# Patient Record
Sex: MALE | Race: WHITE | NOT HISPANIC OR LATINO | Employment: OTHER | URBAN - METROPOLITAN AREA
[De-identification: names, ages, dates, MRNs, and addresses within clinical notes are randomized per-mention and may not be internally consistent; named-entity substitution may affect disease eponyms.]

---

## 2018-11-19 ENCOUNTER — HOSPITAL ENCOUNTER (OUTPATIENT)
Dept: GENERAL RADIOLOGY | Facility: HOSPITAL | Age: 78
Discharge: HOME | End: 2018-11-19
Attending: INTERNAL MEDICINE | Admitting: INTERNAL MEDICINE

## 2018-11-19 ENCOUNTER — TRANSCRIBE ORDERS (OUTPATIENT)
Dept: ADMINISTRATIVE | Facility: HOSPITAL | Age: 78
End: 2018-11-19

## 2018-11-19 DIAGNOSIS — R05.9 COUGH: Primary | ICD-10-CM

## 2018-11-19 PROCEDURE — 71046 X-RAY EXAM CHEST 2 VIEWS: CPT

## 2019-06-04 ENCOUNTER — LAB REQUISITION (OUTPATIENT)
Dept: LAB | Facility: HOSPITAL | Age: 79
End: 2019-06-04

## 2019-06-04 DIAGNOSIS — Z00.00 ROUTINE GENERAL MEDICAL EXAMINATION AT A HEALTH CARE FACILITY: ICD-10-CM

## 2019-06-04 PROCEDURE — 36415 COLL VENOUS BLD VENIPUNCTURE: CPT | Performed by: INTERNAL MEDICINE

## 2022-10-28 PROBLEM — M81.0 SENILE OSTEOPOROSIS: Status: ACTIVE | Noted: 2022-10-28

## 2022-10-31 ENCOUNTER — INFUSION (OUTPATIENT)
Dept: ONCOLOGY | Facility: HOSPITAL | Age: 82
End: 2022-10-31

## 2022-10-31 VITALS
SYSTOLIC BLOOD PRESSURE: 134 MMHG | HEART RATE: 106 BPM | HEIGHT: 69 IN | RESPIRATION RATE: 18 BRPM | DIASTOLIC BLOOD PRESSURE: 82 MMHG | BODY MASS INDEX: 25.62 KG/M2 | WEIGHT: 173 LBS | TEMPERATURE: 97.8 F

## 2022-10-31 DIAGNOSIS — M81.0 SENILE OSTEOPOROSIS: Primary | ICD-10-CM

## 2022-10-31 PROCEDURE — 96372 THER/PROPH/DIAG INJ SC/IM: CPT

## 2022-10-31 PROCEDURE — 25010000002 DENOSUMAB 60 MG/ML SOLUTION PREFILLED SYRINGE: Performed by: INTERNAL MEDICINE

## 2022-10-31 RX ORDER — FUROSEMIDE 20 MG/1
20 TABLET ORAL EVERY OTHER DAY
COMMUNITY

## 2022-10-31 RX ORDER — DOXAZOSIN 2 MG/1
2 TABLET ORAL DAILY
COMMUNITY
Start: 2022-08-24

## 2022-10-31 RX ORDER — RIVAROXABAN 20 MG/1
20 TABLET, FILM COATED ORAL DAILY
COMMUNITY
Start: 2022-09-13

## 2022-10-31 RX ORDER — FLUTICASONE PROPIONATE 50 MCG
2 SPRAY, SUSPENSION (ML) NASAL DAILY
COMMUNITY

## 2022-10-31 RX ORDER — ROSUVASTATIN CALCIUM 20 MG/1
20 TABLET, COATED ORAL
COMMUNITY
Start: 2022-08-07

## 2022-10-31 RX ADMIN — DENOSUMAB 60 MG: 60 INJECTION SUBCUTANEOUS at 11:09

## 2023-02-28 ENCOUNTER — TRANSCRIBE ORDERS (OUTPATIENT)
Dept: ADMINISTRATIVE | Facility: HOSPITAL | Age: 83
End: 2023-02-28
Payer: MEDICARE

## 2023-02-28 DIAGNOSIS — R22.1 LOCALIZED SWELLING, MASS AND LUMP, NECK: Primary | ICD-10-CM

## 2023-03-09 ENCOUNTER — HOSPITAL ENCOUNTER (OUTPATIENT)
Dept: ULTRASOUND IMAGING | Facility: HOSPITAL | Age: 83
Discharge: HOME | End: 2023-03-09
Admitting: INTERNAL MEDICINE
Payer: MEDICARE

## 2023-03-09 DIAGNOSIS — R22.1 LOCALIZED SWELLING, MASS AND LUMP, NECK: ICD-10-CM

## 2023-03-09 PROCEDURE — 76536 US EXAM OF HEAD AND NECK: CPT

## 2023-03-13 ENCOUNTER — TRANSCRIBE ORDERS (OUTPATIENT)
Dept: ADMINISTRATIVE | Facility: HOSPITAL | Age: 83
End: 2023-03-13
Payer: MEDICARE

## 2023-03-13 DIAGNOSIS — R22.1 LOCALIZED SWELLING, MASS AND LUMP, NECK: Primary | ICD-10-CM

## 2023-03-14 ENCOUNTER — HOSPITAL ENCOUNTER (OUTPATIENT)
Dept: CT IMAGING | Facility: HOSPITAL | Age: 83
Discharge: HOME | End: 2023-03-14
Admitting: INTERNAL MEDICINE
Payer: MEDICARE

## 2023-03-14 DIAGNOSIS — R22.1 LOCALIZED SWELLING, MASS AND LUMP, NECK: ICD-10-CM

## 2023-03-14 LAB — CREAT BLDA-MCNC: 1.4 MG/DL (ref 0.6–1.3)

## 2023-03-14 PROCEDURE — 70491 CT SOFT TISSUE NECK W/DYE: CPT

## 2023-03-14 PROCEDURE — 82565 ASSAY OF CREATININE: CPT

## 2023-03-14 PROCEDURE — 25510000001 IOPAMIDOL 61 % SOLUTION: Performed by: INTERNAL MEDICINE

## 2023-03-14 RX ADMIN — IOPAMIDOL 80 ML: 612 INJECTION, SOLUTION INTRAVENOUS at 14:51

## 2023-04-05 ENCOUNTER — OFFICE VISIT (OUTPATIENT)
Dept: CARDIOLOGY | Facility: CLINIC | Age: 83
End: 2023-04-05
Payer: MEDICARE

## 2023-04-05 VITALS
OXYGEN SATURATION: 98 % | HEART RATE: 69 BPM | DIASTOLIC BLOOD PRESSURE: 74 MMHG | HEIGHT: 69 IN | WEIGHT: 171 LBS | SYSTOLIC BLOOD PRESSURE: 116 MMHG | BODY MASS INDEX: 25.33 KG/M2

## 2023-04-05 DIAGNOSIS — I10 ESSENTIAL HYPERTENSION: ICD-10-CM

## 2023-04-05 DIAGNOSIS — I48.21 PERMANENT ATRIAL FIBRILLATION: Primary | ICD-10-CM

## 2023-04-05 DIAGNOSIS — Z01.810 PRE-OPERATIVE CARDIOVASCULAR EXAMINATION: ICD-10-CM

## 2023-04-05 DIAGNOSIS — E78.2 MIXED HYPERLIPIDEMIA: ICD-10-CM

## 2023-04-05 PROCEDURE — 3078F DIAST BP <80 MM HG: CPT | Performed by: INTERNAL MEDICINE

## 2023-04-05 PROCEDURE — 93000 ELECTROCARDIOGRAM COMPLETE: CPT | Performed by: INTERNAL MEDICINE

## 2023-04-05 PROCEDURE — 99204 OFFICE O/P NEW MOD 45 MIN: CPT | Performed by: INTERNAL MEDICINE

## 2023-04-05 PROCEDURE — 3074F SYST BP LT 130 MM HG: CPT | Performed by: INTERNAL MEDICINE

## 2023-04-05 RX ORDER — MELATONIN
2000 DAILY
COMMUNITY

## 2023-04-05 RX ORDER — EZETIMIBE 10 MG/1
10 TABLET ORAL DAILY
COMMUNITY

## 2023-04-05 RX ORDER — OMEGA-3-ACID ETHYL ESTERS 1 G/1
2 CAPSULE, LIQUID FILLED ORAL DAILY
COMMUNITY
Start: 2022-12-28

## 2023-04-05 RX ORDER — DILTIAZEM HYDROCHLORIDE 240 MG/1
1 CAPSULE, COATED, EXTENDED RELEASE ORAL DAILY
COMMUNITY
Start: 2023-03-05

## 2023-04-05 RX ORDER — SACCHAROMYCES BOULARDII 250 MG
250 CAPSULE ORAL 2 TIMES DAILY
COMMUNITY

## 2023-04-05 RX ORDER — ACETAMINOPHEN 500 MG
500 TABLET ORAL EVERY 6 HOURS PRN
COMMUNITY

## 2023-04-05 RX ORDER — FINASTERIDE 5 MG/1
1 TABLET, FILM COATED ORAL DAILY
COMMUNITY
Start: 2023-03-05

## 2023-04-05 NOTE — PROGRESS NOTES
[facility] Cardiology  [address], [address]  [place], [place], [address] [phone]  WWW.[facility]           OUTPATIENT CLINIC CONSULTATION NOTE    Patient care team:  Patient Care Team:  Ronan Gardner MD as PCP - General (Internal Medicine)    Requesting Provider and Reason for consultation: The patient is being seen today at the request of Dr. Gardner for preoperative cardiac risk assessment.     Subjective:   Chief complaint:   Chief Complaint   Patient presents with   • Establish Care     Consultation for cardiac clearance        HPI:    Esau Ramey is a 82 y.o. male.  Cardiac focused problem list:  1. Atrial fibrillation  a. 2/26/2016 EKG in EMR confirming rhythm  b. Prior ECV with conversion to NSR, but possibly in atrial fibrillation for many years  c. Previously followed by Dr. Britt anthony. No known prior stress test or heart cath  2. Parotid cancer  3. Thyroid nodules  4. Essential hypertension  5. Mixed hyperlipidemia  6. CKD  7. ASIF  8. History of prediabetes  9. Asthma  10. BPH    Patient presents for preoperative cardiac risk assessment.  History of A-fib discussed with the patient today.  Some details noted above.  Denies palpitations.  Heart rate seems to be controlled on diltiazem.  Able to walk 1 mile without cardiopulmonary symptoms.  Could walk further, but generally does try to.  Thyroid biopsy today.  Has been holding Xarelto.  No bleeding diathesis on Xarelto    Non-smoker.    Review of Systems:  As noted above in the HPI    PFSH:  Patient Active Problem List   Diagnosis   • Senile osteoporosis         Current Outpatient Medications:   •  acetaminophen (TYLENOL) 500 MG tablet, Take 1 tablet by mouth Every 6 (Six) Hours As Needed for Mild Pain., Disp: , Rfl:   •  Cholecalciferol 25 MCG (1000 UT) tablet, Take 2 tablets by mouth Daily., Disp: , Rfl:   •  dilTIAZem CD (CARDIZEM CD) 240 MG 24 hr capsule, Take 1 capsule by mouth Daily., Disp: ,  "Rfl:   •  doxazosin (CARDURA) 2 MG tablet, Take 1 tablet by mouth Daily., Disp: , Rfl:   •  ezetimibe (ZETIA) 10 MG tablet, Take 1 tablet by mouth Daily., Disp: , Rfl:   •  finasteride (PROSCAR) 5 MG tablet, Take 1 tablet by mouth Daily., Disp: , Rfl:   •  fluticasone (FLONASE) 50 MCG/ACT nasal spray, 2 sprays into the nostril(s) as directed by provider Daily., Disp: , Rfl:   •  furosemide (LASIX) 20 MG tablet, Take 1 tablet by mouth Every Other Day. Takes as needed, typically takes QOD, Disp: , Rfl:   •  omega-3 acid ethyl esters (LOVAZA) 1 g capsule, Take 2 capsules by mouth Daily., Disp: , Rfl:   •  rosuvastatin (CRESTOR) 20 MG tablet, Take 1 tablet by mouth every night at bedtime., Disp: , Rfl:   •  saccharomyces boulardii (FLORASTOR) 250 MG capsule, Take 1 capsule by mouth 2 (Two) Times a Day., Disp: , Rfl:   •  Xarelto 20 MG tablet, Take 1 tablet by mouth Daily. with food, Disp: , Rfl:     No Known Allergies    Social History     Socioeconomic History   • Marital status:    Tobacco Use   • Smoking status: Never     Passive exposure: Past   • Smokeless tobacco: Never   Vaping Use   • Vaping Use: Never used   Substance and Sexual Activity   • Alcohol use: Yes     Alcohol/week: 2.0 standard drinks     Types: 2 Glasses of wine per week     Comment: Occasionally   • Drug use: Never   • Sexual activity: Defer     Family History   Problem Relation Age of Onset   • No Known Problems Mother    • No Known Problems Father          Objective:   Physical Exam:  /74   Pulse 69   Ht 175.3 cm (69\")   Wt 77.6 kg (171 lb)   SpO2 98%   BMI 25.25 kg/m²   CONSTITUTIONAL: Well-nourished. In no acute distress.   LUNGS: Normal effort. Clear to auscultation bilaterally without wheezing, rhonchi, or rales noted.   CARDIOVASCULAR: Irregularly irregular rate and rhythm.  Soft systolic murmur at right upper sternal border without radiation.  Carotid upstrokes are 2+ and symmetrical without bruits.  2+ radial pulses " bilaterally.  There is mild peripheral edema.     Labs:  Labs reviewed by myself    No results found for: CHOL  No results found for: TRIG  No results found for: HDL  No results found for: LDL  No components found for: LDLDIRECTC    Diagnostic Data:      ECG 12 Lead    Date/Time: 4/5/2023 10:34 AM  Performed by: Alberto Joy MD  Authorized by: Alberto Joy MD   Comparison: compared with previous ECG from 2/26/2016  Similar to previous ECG  Rhythm: atrial fibrillation                Assessment and Plan:     Permanent atrial fibrillation  Soft cardiac murmur, suspected aortic valve sclerosis  Pre-operative cardiovascular examination  -Recommend preoperative echo.  We will review most recent labs when available.  If echo without remarkable findings okay to proceed.  We will send a note to Dr. Multani's office for final preoperative cardiac risk assessment including recommendations regarding Xarelto (hold for 48 full hours prior to the day of surgery)  -Continue diltiazem, Xarelto    Essential hypertension  -At goal, continue medical therapy    Mixed hyperlipidemia   -Requesting PCP labs.  Continue current medical therapy    - Return in about 1 year (around 4/5/2024) for Next scheduled follow-up with an ECG.    Alberto Joy MD, MSc, FACC, [facility]  Interventional Cardiology  [facility]

## 2023-04-12 ENCOUNTER — HOSPITAL ENCOUNTER (OUTPATIENT)
Dept: CARDIOLOGY | Facility: HOSPITAL | Age: 83
Discharge: HOME | End: 2023-04-12
Admitting: INTERNAL MEDICINE
Payer: MEDICARE

## 2023-04-12 VITALS
HEIGHT: 69 IN | SYSTOLIC BLOOD PRESSURE: 132 MMHG | WEIGHT: 171 LBS | DIASTOLIC BLOOD PRESSURE: 81 MMHG | BODY MASS INDEX: 25.33 KG/M2

## 2023-04-12 DIAGNOSIS — I48.21 PERMANENT ATRIAL FIBRILLATION: ICD-10-CM

## 2023-04-12 LAB
BH CV ECHO MEAS - AI P1/2T: 441.5 MSEC
BH CV ECHO MEAS - AO MAX PG: 4.2 MMHG
BH CV ECHO MEAS - AO MEAN PG: 2.2 MMHG
BH CV ECHO MEAS - AO ROOT DIAM: 3.6 CM
BH CV ECHO MEAS - AO V2 MAX: 102.6 CM/SEC
BH CV ECHO MEAS - AO V2 VTI: 19.9 CM
BH CV ECHO MEAS - AVA(I,D): 2.6 CM2
BH CV ECHO MEAS - EDV(CUBED): 32.8 ML
BH CV ECHO MEAS - EDV(MOD-SP2): 56.1 ML
BH CV ECHO MEAS - EDV(MOD-SP4): 61.3 ML
BH CV ECHO MEAS - EF(MOD-BP): 60.8 %
BH CV ECHO MEAS - EF(MOD-SP2): 59.7 %
BH CV ECHO MEAS - EF(MOD-SP4): 60.2 %
BH CV ECHO MEAS - ESV(CUBED): 11.1 ML
BH CV ECHO MEAS - ESV(MOD-SP2): 22.6 ML
BH CV ECHO MEAS - ESV(MOD-SP4): 24.4 ML
BH CV ECHO MEAS - FS: 30.3 %
BH CV ECHO MEAS - IVS/LVPW: 1 CM
BH CV ECHO MEAS - IVSD: 1.3 CM
BH CV ECHO MEAS - LA DIMENSION: 4 CM
BH CV ECHO MEAS - LAT PEAK E' VEL: 10.2 CM/SEC
BH CV ECHO MEAS - LV DIASTOLIC VOL/BSA (35-75): 31.7 CM2
BH CV ECHO MEAS - LV MASS(C)D: 135.7 GRAMS
BH CV ECHO MEAS - LV MAX PG: 1.73 MMHG
BH CV ECHO MEAS - LV MEAN PG: 1 MMHG
BH CV ECHO MEAS - LV SYSTOLIC VOL/BSA (12-30): 12.6 CM2
BH CV ECHO MEAS - LV V1 MAX: 65.5 CM/SEC
BH CV ECHO MEAS - LV V1 VTI: 12.8 CM
BH CV ECHO MEAS - LVIDD: 3.2 CM
BH CV ECHO MEAS - LVIDS: 2.23 CM
BH CV ECHO MEAS - LVOT AREA: 4.1 CM2
BH CV ECHO MEAS - LVOT DIAM: 2.27 CM
BH CV ECHO MEAS - LVPWD: 1.3 CM
BH CV ECHO MEAS - MED PEAK E' VEL: 7.4 CM/SEC
BH CV ECHO MEAS - MR MAX PG: 61.4 MMHG
BH CV ECHO MEAS - MR MAX VEL: 391.6 CM/SEC
BH CV ECHO MEAS - MV DEC SLOPE: 617.5 CM/SEC2
BH CV ECHO MEAS - MV MAX PG: 6.7 MMHG
BH CV ECHO MEAS - MV MEAN PG: 2.19 MMHG
BH CV ECHO MEAS - MV P1/2T: 61.8 MSEC
BH CV ECHO MEAS - MV V2 VTI: 28.8 CM
BH CV ECHO MEAS - MVA(P1/2T): 3.6 CM2
BH CV ECHO MEAS - MVA(VTI): 1.8 CM2
BH CV ECHO MEAS - PA ACC TIME: 0.15 SEC
BH CV ECHO MEAS - PA PR(ACCEL): 9.3 MMHG
BH CV ECHO MEAS - PA V2 MAX: 93.7 CM/SEC
BH CV ECHO MEAS - PI END-D VEL: 71.9 CM/SEC
BH CV ECHO MEAS - RAP SYSTOLE: 15 MMHG
BH CV ECHO MEAS - RVSP: 43 MMHG
BH CV ECHO MEAS - SI(MOD-SP2): 17.3 ML/M2
BH CV ECHO MEAS - SI(MOD-SP4): 19.1 ML/M2
BH CV ECHO MEAS - SV(LVOT): 51.9 ML
BH CV ECHO MEAS - SV(MOD-SP2): 33.5 ML
BH CV ECHO MEAS - SV(MOD-SP4): 36.9 ML
BH CV ECHO MEAS - TAPSE (>1.6): 1.87 CM
BH CV ECHO MEAS - TR MAX PG: 28 MMHG
BH CV ECHO MEAS - TR MAX VEL: 195.7 CM/SEC
BH CV XLRA - RV BASE: 4 CM
BH CV XLRA - RV LENGTH: 6.6 CM
BH CV XLRA - RV MID: 3.1 CM
BH CV XLRA - TDI S': 14.4 CM/SEC
LEFT ATRIUM VOLUME INDEX: 49.5 ML/M2
MAXIMAL PREDICTED HEART RATE: 139 BPM
STRESS TARGET HR: 118 BPM

## 2023-04-12 PROCEDURE — 93306 TTE W/DOPPLER COMPLETE: CPT

## 2023-04-12 PROCEDURE — 93306 TTE W/DOPPLER COMPLETE: CPT | Performed by: INTERNAL MEDICINE

## 2023-04-13 ENCOUNTER — TELEPHONE (OUTPATIENT)
Dept: CARDIOLOGY | Facility: CLINIC | Age: 83
End: 2023-04-13
Payer: MEDICARE

## 2023-04-13 NOTE — TELEPHONE ENCOUNTER
----- Message from Alberto Joy MD sent at 4/12/2023  6:38 PM EDT -----  Echo shows longstanding changes including dilated atria, which is likely why he is in A-fib.  Okay to proceed with surgery.  Continue current plan.  Hold Xarelto for 48 hours prior to the day of surgery.  Please send preoperative cardiac assessment to ENT office.  Low cardiac risk for surgery.  At risk for perioperative volume overload, monitor volume status perioperatively  ----- Message -----  From: Alberto Joy MD  Sent: 4/12/2023   6:37 PM EDT  To: Alberto Joy MD

## 2023-04-13 NOTE — TELEPHONE ENCOUNTER
LVM with results and recommendations. Requested return call with questions or to discuss further.       Will send letter to [place] [facility] regarding surgical clearance.

## 2023-04-13 NOTE — LETTER
April 13, 2023       No Recipients    Patient: Esau Ramey   YOB: 1940/23      To Whom It May Concern,     Esau Ramey  1940 is okay to proceed with planned ENT surgery from a cardiac standpoint. Patient is low cardiac risk.  No further cardiac testing or medication titration is indicated prior to surgery. For any questions,  please call [phone].    [x]  Hold Xarelto 48 hours prior       [x]  Other: At risk for perioperative volume overload, monitor volume status perioperatively            Sincerely,         Alberto Joy MD

## 2023-05-11 ENCOUNTER — INFUSION (OUTPATIENT)
Dept: ONCOLOGY | Facility: HOSPITAL | Age: 83
End: 2023-05-11
Payer: MEDICARE

## 2023-05-11 VITALS
RESPIRATION RATE: 18 BRPM | DIASTOLIC BLOOD PRESSURE: 65 MMHG | HEART RATE: 89 BPM | SYSTOLIC BLOOD PRESSURE: 123 MMHG | TEMPERATURE: 97.2 F

## 2023-05-11 DIAGNOSIS — M81.0 SENILE OSTEOPOROSIS: Primary | ICD-10-CM

## 2023-05-11 PROCEDURE — 96372 THER/PROPH/DIAG INJ SC/IM: CPT

## 2023-05-11 PROCEDURE — 25010000002 DENOSUMAB 60 MG/ML SOLUTION PREFILLED SYRINGE: Performed by: INTERNAL MEDICINE

## 2023-05-11 RX ADMIN — DENOSUMAB 60 MG: 60 INJECTION SUBCUTANEOUS at 09:54

## 2023-11-09 ENCOUNTER — INFUSION (OUTPATIENT)
Dept: ONCOLOGY | Facility: HOSPITAL | Age: 83
End: 2023-11-09
Payer: MEDICARE

## 2023-11-09 VITALS
TEMPERATURE: 97.7 F | RESPIRATION RATE: 18 BRPM | SYSTOLIC BLOOD PRESSURE: 110 MMHG | HEART RATE: 80 BPM | DIASTOLIC BLOOD PRESSURE: 76 MMHG

## 2023-11-09 DIAGNOSIS — M81.0 SENILE OSTEOPOROSIS: Primary | ICD-10-CM

## 2023-11-09 PROCEDURE — 25010000002 DENOSUMAB 60 MG/ML SOLUTION PREFILLED SYRINGE: Performed by: INTERNAL MEDICINE

## 2023-11-09 PROCEDURE — 96372 THER/PROPH/DIAG INJ SC/IM: CPT

## 2023-11-09 RX ADMIN — DENOSUMAB 60 MG: 60 INJECTION SUBCUTANEOUS at 14:04

## 2024-05-09 ENCOUNTER — INFUSION (OUTPATIENT)
Dept: ONCOLOGY | Facility: HOSPITAL | Age: 84
End: 2024-05-09
Payer: MEDICARE

## 2024-05-09 VITALS
HEART RATE: 75 BPM | TEMPERATURE: 97.2 F | DIASTOLIC BLOOD PRESSURE: 68 MMHG | SYSTOLIC BLOOD PRESSURE: 119 MMHG | RESPIRATION RATE: 18 BRPM

## 2024-05-09 DIAGNOSIS — M81.0 SENILE OSTEOPOROSIS: Primary | ICD-10-CM

## 2024-05-09 PROCEDURE — 96372 THER/PROPH/DIAG INJ SC/IM: CPT

## 2024-05-09 PROCEDURE — 25010000002 DENOSUMAB 60 MG/ML SOLUTION PREFILLED SYRINGE: Performed by: INTERNAL MEDICINE

## 2024-05-09 RX ORDER — DONEPEZIL HYDROCHLORIDE 5 MG/1
1 TABLET, FILM COATED ORAL DAILY
COMMUNITY
Start: 2024-02-19

## 2024-05-09 RX ADMIN — DENOSUMAB 60 MG: 60 INJECTION SUBCUTANEOUS at 13:44

## 2024-05-29 ENCOUNTER — TRANSCRIBE ORDERS (OUTPATIENT)
Dept: ADMINISTRATIVE | Facility: HOSPITAL | Age: 84
End: 2024-05-29
Payer: MEDICARE

## 2024-05-29 DIAGNOSIS — R13.10 DYSPHAGIA, UNSPECIFIED TYPE: Primary | ICD-10-CM

## 2024-06-04 ENCOUNTER — HOSPITAL ENCOUNTER (OUTPATIENT)
Dept: GENERAL RADIOLOGY | Facility: HOSPITAL | Age: 84
Discharge: HOME | End: 2024-06-04
Admitting: OTOLARYNGOLOGY
Payer: MEDICARE

## 2024-06-04 DIAGNOSIS — R13.10 DYSPHAGIA, UNSPECIFIED TYPE: ICD-10-CM

## 2024-06-04 PROCEDURE — 74230 X-RAY XM SWLNG FUNCJ C+: CPT

## 2024-06-04 PROCEDURE — 92611 MOTION FLUOROSCOPY/SWALLOW: CPT

## 2024-06-04 PROCEDURE — A9270 NON-COVERED ITEM OR SERVICE: HCPCS | Performed by: OTOLARYNGOLOGY

## 2024-06-04 PROCEDURE — 63710000001 BARIUM SULFATE 40 % RECONSTITUTED SUSPENSION: Performed by: OTOLARYNGOLOGY

## 2024-06-04 PROCEDURE — 63710000001 BARIUM SULFATE 40 % PASTE: Performed by: OTOLARYNGOLOGY

## 2024-06-04 RX ADMIN — BARIUM SULFATE 20 ML: 400 PASTE ORAL at 10:11

## 2024-06-04 RX ADMIN — BARIUM SULFATE 100 ML: 0.81 POWDER, FOR SUSPENSION ORAL at 10:11

## 2024-06-04 NOTE — MBS/VFSS/FEES
Outpatient Speech Language Pathology   Adult Swallow Initial Evaluation   Calli    Modified Barium Swallow Study (MBS)       Patient Name: Esau Ramey  : 1940  MRN: [id number]  Today's Date: 2024         Visit Date: 2024   Patient Active Problem List   Diagnosis    Senile osteoporosis    Permanent atrial fibrillation    Essential hypertension    Mixed hyperlipidemia        Past Medical History:   Diagnosis Date    Arthropathy     Atrial fibrillation     Chronic Kidney disease     Hyperlipidemia     Hypertension     Prediabetes         Past Surgical History:   Procedure Laterality Date    SKIN BIOPSY      4x         Visit Dx:     ICD-10-CM ICD-9-CM   1. Dysphagia, unspecified type  R13.10 787.20                SLP Adult Swallow Evaluation       Row Name 24 0930       Rehab Evaluation    Document Type evaluation;other (see comments)  Modified Barium Swallow  -    Subjective Information no complaints  -    Patient Observations alert;cooperative;agree to therapy  -    Patient/Family/Caregiver Comments/Observations none present  -    Patient Effort excellent  -       General Information    Patient Profile Reviewed yes  -    Pertinent History Of Current Problem Hx R parotid mass s/p resection, thyroid nodules, senile osteoporosis. Patient c/o coughing at meals with both food and liquids. No recent pneumonia.  -    Current Method of Nutrition regular textures;thin liquids  -    Precautions/Limitations, Vision WFL;for purposes of eval  -    Precautions/Limitations, Hearing WFL;for purposes of eval  -    Prior Level of Function-Communication WFL  -    Prior Level of Function-Swallowing no diet consistency restrictions  -    Plans/Goals Discussed with patient;agreed upon  -    Barriers to Rehab none identified  -    Patient's Goals for Discharge eat/drink without coughing/choking  -       Pain    Additional Documentation Pain Scale: FACES Pre/Post-Treatment  (Group)  -CH       Pain Scale: FACES Pre/Post-Treatment    Pain: FACES Scale, Pretreatment 0-->no hurt  -CH    Posttreatment Pain Rating 0-->no hurt  -CH       Oral Motor Structure and Function    Dentition Assessment natural, present and adequate  -CH    Secretion Management WNL/WFL  -CH    Mucosal Quality moist, healthy  -CH    Volitional Swallow WFL  -CH       Oral Musculature and Cranial Nerve Assessment    Oral Motor General Assessment WFL  -CH       General Eating/Swallowing Observations    Respiratory Support Currently in Use room air  -    Eating/Swallowing Skills self-fed;appropriate self-feeding skills observed  -    Positioning During Eating upright 90 degree;upright in chair  -       MBS/VFSS    Utensils Used spoon;cup;straw  -    Consistencies Trialed thin liquids;pureed;regular textures  -       MBS/VFSS Interpretation    Oral Prep Phase WFL  -CH    Oral Transit Phase WFL  -CH    Oral Residue WFL  -CH    VFSS Summary Oral phase of swallow is grossly WFL. Penetration before the swallow with thin liquids via large straw sip only. No penetration with spoon or cup sips. No aspiration observed during study. Mild residue in vallecula observed which cleared with subsequent swallow. Cricopharyngeal bar noted during study which did not impede bolus flow but may result in increased sensitivity for patient. Recommend continuation of regular diet and thin liquids, no straw, small bites/sips, slow pace of eating, chew well.  -CH       Initiation of Pharyngeal Swallow    Initiation of Pharyngeal Swallow bolus in pyriform sinuses  -CH    Pharyngeal Phase impaired pharyngeal phase of swallowing  -CH    Anatomical abnormalities noted prominent cricopharyngeous/ cricopharyngeal bar  -CH    Anatomical abnormalities functional impact no functional impact on swallowing  -CH    Penetration Before the Swallow thin liquids;other (see comments)  via large straw sip only  -CH    Depth of Penetration deep  -CH     Response to Penetration No  -    No spontaneous response to penetration and effective laryngeal clearance with cue (see comments)  -    Rosenbek's Scale thin:;3--->level 3;pudding/puree:;regular textures:;1--->level 1  -CH    Pharyngeal Residue pudding/puree;valleculae;secondary to reduced posterior pharyngeal wall stripping  -    Response to Residue cleared residue with spontaneous subsequent swallow  -    Attempted Compensatory Maneuvers bolus size;bolus presentation style  -    Response to Attempted Compensatory Maneuvers prevented penetration  -    Successful Compensatory Maneuver Competency patient able to;demonstrate compensations;independently  -       Swallowing Quality of Life Assessment    Education and counseling provided Signs of aspiration;Risks of aspiration;Safest diet options;Compensatory strategy recommendations and rationale;Aspiration precautions  -       SLP Evaluation Clinical Impression    SLP Swallowing Diagnosis functional oral phase;mild;pharyngeal dysphagia  -    Functional Impact risk of aspiration/pneumonia  -       Recommendations    Therapy Frequency (Swallow) evaluation only  -    SLP Diet Recommendation regular textures;thin liquids;other (see comments)  no straw  -    Recommended Precautions and Strategies upright posture during/after eating;small bites of food and sips of liquid;no straw;general aspiration precautions;other (see comments)  swallow again , slow pace of eating, small cup sips  -    SLP Rec. for Method of Medication Administration meds whole;as tolerated  -              User Key  (r) = Recorded By, (t) = Taken By, (c) = Cosigned By      Initials Name Provider Type     Jennifer Sharp MS CCC-SLP Speech and Language Pathologist                                             Time Calculation:   SLP Start Time: 0930  Untimed Charges  SLP Eval/Re-eval : ST Motion Fluoro Eval Swallow - 89868  93005-TK Motion Fluoro Eval Swallow Minutes:  85  Total Minutes  Untimed Charges Total Minutes: 85   Total Minutes: 85    Therapy Charges for Today       Code Description Service Date Service Provider Modifiers Qty    42902997331 HC ST MOTION FLUORO EVAL SWALLOW 6 6/4/2024 Jennifer Sharp, MS CCC-SLP GN 1                     Jennifer Sharp MS CCC-SLP  6/4/2024

## 2024-07-30 ENCOUNTER — TELEPHONE (OUTPATIENT)
Dept: CARDIOLOGY | Facility: CLINIC | Age: 84
End: 2024-07-30
Payer: MEDICARE

## 2024-07-30 NOTE — TELEPHONE ENCOUNTER
Caller: Esau Ramey    Relationship to patient: Self    Best call back number: [phone]      Type of visit: F/U APPT    Requested date: NEXT AVAILABLE     Additional notes:PLEASE CONTACT PATIENT WITH A SUITABLE DATE.

## 2024-09-30 ENCOUNTER — OFFICE VISIT (OUTPATIENT)
Dept: CARDIOLOGY | Facility: CLINIC | Age: 84
End: 2024-09-30
Payer: MEDICARE

## 2024-09-30 VITALS
BODY MASS INDEX: 23.93 KG/M2 | WEIGHT: 161.6 LBS | DIASTOLIC BLOOD PRESSURE: 64 MMHG | HEART RATE: 78 BPM | HEIGHT: 69 IN | SYSTOLIC BLOOD PRESSURE: 126 MMHG | OXYGEN SATURATION: 98 %

## 2024-09-30 DIAGNOSIS — I48.0 PAROXYSMAL ATRIAL FIBRILLATION: Primary | ICD-10-CM

## 2024-09-30 DIAGNOSIS — I10 ESSENTIAL HYPERTENSION: ICD-10-CM

## 2024-09-30 DIAGNOSIS — E78.2 MIXED HYPERLIPIDEMIA: ICD-10-CM

## 2024-09-30 PROCEDURE — 93000 ELECTROCARDIOGRAM COMPLETE: CPT | Performed by: INTERNAL MEDICINE

## 2024-09-30 PROCEDURE — 3078F DIAST BP <80 MM HG: CPT | Performed by: INTERNAL MEDICINE

## 2024-09-30 PROCEDURE — 3074F SYST BP LT 130 MM HG: CPT | Performed by: INTERNAL MEDICINE

## 2024-09-30 PROCEDURE — 99214 OFFICE O/P EST MOD 30 MIN: CPT | Performed by: INTERNAL MEDICINE

## 2024-09-30 RX ORDER — EPINEPHRINE 0.3 MG/.3ML
0.3 INJECTION SUBCUTANEOUS AS NEEDED
COMMUNITY

## 2024-09-30 NOTE — PROGRESS NOTES
[facility] Cardiology  [address], [address]  [place], [place], [address] [phone]  [facility]           OUTPATIENT CLINIC NOTE    Patient care team:  Patient Care Team:  Ronan Gardner MD as PCP - General (Internal Medicine)      Subjective:   Chief complaint:   Chief Complaint   Patient presents with    Permanent atrial fibrillation       HPI:    Esau Ramey is a 83 y.o. male.  Cardiac focused problem list:  Atrial fibrillation  2/26/2016 EKG in EMR confirming rhythm  Prior ECV with conversion to NSR, but possibly in atrial fibrillation for many years  Previously followed by Dr. Britt Orellana  No known prior stress test or heart cath  Parotid cancer  Parotidectomy 5/2023  Thyroid nodules  Essential hypertension  Mixed hyperlipidemia  CKD  ASIF  History of prediabetes  Asthma  BPH    Patient presents for preoperative cardiac risk assessment.  Denies palpitations.  Heart rate seems to be controlled on diltiazem.  Able to walk 1 mile without cardiopulmonary symptoms.  No bleeding diathesis on Xarelto    Possible upcoming thyroid surgery, [facility]    Non-smoker.    Review of Systems:  As noted above in the HPI    PFSH:  Patient Active Problem List   Diagnosis    Senile osteoporosis    Permanent atrial fibrillation    Essential hypertension    Mixed hyperlipidemia         Current Outpatient Medications:     acetaminophen (TYLENOL) 500 MG tablet, Take 1 tablet by mouth Every 6 (Six) Hours As Needed for Mild Pain., Disp: , Rfl:     Cholecalciferol 25 MCG (1000 UT) tablet, Take 2 tablets by mouth Daily., Disp: , Rfl:     dilTIAZem CD (CARDIZEM CD) 240 MG 24 hr capsule, Take 1 capsule by mouth Daily., Disp: , Rfl:     donepezil (ARICEPT) 5 MG tablet, Take 1 tablet by mouth Daily., Disp: , Rfl:     doxazosin (CARDURA) 2 MG tablet, Take 1 tablet by mouth Daily., Disp: , Rfl:     EPINEPHrine (EPIPEN) 0.3 MG/0.3ML solution auto-injector injection, Inject 0.3 mL into the  "appropriate muscle as directed by prescriber As Needed., Disp: , Rfl:     ezetimibe (ZETIA) 10 MG tablet, Take 1 tablet by mouth Daily., Disp: , Rfl:     finasteride (PROSCAR) 5 MG tablet, Take 1 tablet by mouth Daily., Disp: , Rfl:     fluticasone (FLONASE) 50 MCG/ACT nasal spray, Administer 2 sprays into the nostril(s) as directed by provider Daily., Disp: , Rfl:     furosemide (LASIX) 20 MG tablet, Take 1 tablet by mouth Every Other Day. Takes as needed, typically takes QOD, Disp: , Rfl:     omega-3 acid ethyl esters (LOVAZA) 1 g capsule, Take 2 capsules by mouth Daily., Disp: , Rfl:     rosuvastatin (CRESTOR) 20 MG tablet, Take 1 tablet by mouth every night at bedtime., Disp: , Rfl:     Xarelto 20 MG tablet, Take 1 tablet by mouth Daily. with food, Disp: , Rfl:     No Known Allergies    Social History     Socioeconomic History    Marital status:    Tobacco Use    Smoking status: Never     Passive exposure: Past    Smokeless tobacco: Never   Vaping Use    Vaping status: Never Used   Substance and Sexual Activity    Alcohol use: Yes     Alcohol/week: 2.0 standard drinks of alcohol     Types: 2 Glasses of wine per week     Comment: Occasionally    Drug use: Never    Sexual activity: Defer         Objective:   Physical Exam:  /64 (BP Location: Right arm, Patient Position: Sitting, Cuff Size: Adult)   Pulse 78   Ht 175.3 cm (69\")   Wt 73.3 kg (161 lb 9.6 oz)   SpO2 98%   BMI 23.86 kg/m²   CONSTITUTIONAL: Well-nourished. In no acute distress.   CARDIOVASCULAR: Irregularly irregular rate and rhythm.  Soft systolic murmur at right upper sternal border without radiation.  Carotid upstrokes are 2+ and symmetrical without bruits.  2+ radial pulses bilaterally.      Labs:  Labs reviewed by myself    No results found for: \"CHOL\"  No results found for: \"TRIG\"  No results found for: \"HDL\"  No results found for: \"LDL\"  No components found for: \"LDLDIRECTC\"    Diagnostic Data:      ECG 12 Lead    Date/Time: " 9/30/2024 12:12 PM  Performed by: Alberto Joy MD    Authorized by: Alberto Joy MD  Comparison: compared with previous ECG from 4/5/2023  Similar to previous ECG  Rhythm: atrial fibrillation        4/2024 labs: A1c 5.8, creatinine 1.4, LFTs normal, LDL-C 51, HDL 72, TG 49,     Results for orders placed during the hospital encounter of 04/12/23    Adult Transthoracic Echo Complete w/ Color, Spectral and Contrast if necessary per protocol    Interpretation Summary    Left ventricular systolic function is normal. Left ventricular ejection fraction appears to be 56 - 60%.    Left ventricular wall thickness is consistent with mild concentric hypertrophy.    The right ventricular cavity is borderline dilated.    Left atrial volume is severely increased.    The right atrial cavity is moderately dilated.    There is mild calcification of the aortic valve.    Mild aortic valve regurgitation is present.    Moderate tricuspid valve regurgitation is present.    Estimated right ventricular systolic pressure from tricuspid regurgitation is mildly elevated (35-45 mmHg).      Assessment and Plan:     Permanent atrial fibrillation  Aortic valve calcification  -Continue diltiazem, Xarelto    Valvular heart disease  Dilated left atrium  Mildly elevated RVSP  -Will order repeat echo at next visit    Essential hypertension  -Continue current medication    Mixed hyperlipidemia   -Reviewed PCP labs.  Continue statin, Zetia    Preop cardiac risk assessment  -Possible upcoming thyroid surgery.  Tolerated parotidectomy without complication last year and a similar cardiovascular condition  -Okay to proceed from my standpoint  -Hold Xarelto for 3 full days prior to surgery, restart as soon as possible postoperatively if without bleeding complications    - Return in about 1 year (around 9/30/2025) for Next follow up with YOSELYN Ye, Next follow-up with an ECG.    Alberto Joy MD, MSc, FACC, [facility] [place]  Interventional  Cardiology  [facility]

## 2024-11-11 ENCOUNTER — INFUSION (OUTPATIENT)
Dept: ONCOLOGY | Facility: HOSPITAL | Age: 84
End: 2024-11-11
Payer: MEDICARE

## 2024-11-11 VITALS
BODY MASS INDEX: 23.85 KG/M2 | TEMPERATURE: 97.6 F | DIASTOLIC BLOOD PRESSURE: 80 MMHG | SYSTOLIC BLOOD PRESSURE: 144 MMHG | RESPIRATION RATE: 18 BRPM | HEIGHT: 69 IN | HEART RATE: 93 BPM

## 2024-11-11 DIAGNOSIS — M81.0 SENILE OSTEOPOROSIS: Primary | ICD-10-CM

## 2024-11-11 PROCEDURE — 25010000002 DENOSUMAB 60 MG/ML SOLUTION PREFILLED SYRINGE: Performed by: INTERNAL MEDICINE

## 2024-11-11 PROCEDURE — 96372 THER/PROPH/DIAG INJ SC/IM: CPT

## 2024-11-11 RX ADMIN — DENOSUMAB 60 MG: 60 INJECTION SUBCUTANEOUS at 13:40

## 2025-05-12 ENCOUNTER — INFUSION (OUTPATIENT)
Dept: ONCOLOGY | Facility: HOSPITAL | Age: 85
End: 2025-05-12
Payer: MEDICARE

## 2025-05-12 VITALS
DIASTOLIC BLOOD PRESSURE: 73 MMHG | SYSTOLIC BLOOD PRESSURE: 119 MMHG | TEMPERATURE: 96.9 F | RESPIRATION RATE: 18 BRPM | HEART RATE: 82 BPM

## 2025-05-12 DIAGNOSIS — M81.0 SENILE OSTEOPOROSIS: Primary | ICD-10-CM

## 2025-05-12 PROCEDURE — 96372 THER/PROPH/DIAG INJ SC/IM: CPT

## 2025-05-12 PROCEDURE — 25010000002 DENOSUMAB 60 MG/ML SOLUTION PREFILLED SYRINGE: Performed by: INTERNAL MEDICINE

## 2025-05-12 RX ADMIN — DENOSUMAB 60 MG: 60 INJECTION SUBCUTANEOUS at 14:07

## 2025-08-13 ENCOUNTER — TRANSCRIBE ORDERS (OUTPATIENT)
Dept: ADMINISTRATIVE | Facility: HOSPITAL | Age: 85
End: 2025-08-13
Payer: MEDICARE

## 2025-08-13 DIAGNOSIS — M25.512 LEFT SHOULDER PAIN, UNSPECIFIED CHRONICITY: Primary | ICD-10-CM

## 2025-08-21 ENCOUNTER — HOSPITAL ENCOUNTER (OUTPATIENT)
Dept: MRI IMAGING | Facility: HOSPITAL | Age: 85
Discharge: HOME | End: 2025-08-21
Admitting: INTERNAL MEDICINE
Payer: OTHER MISCELLANEOUS

## 2025-08-21 DIAGNOSIS — M25.512 LEFT SHOULDER PAIN, UNSPECIFIED CHRONICITY: ICD-10-CM

## 2025-08-21 PROCEDURE — 73221 MRI JOINT UPR EXTREM W/O DYE: CPT
